# Patient Record
Sex: MALE | Race: WHITE | NOT HISPANIC OR LATINO | URBAN - METROPOLITAN AREA
[De-identification: names, ages, dates, MRNs, and addresses within clinical notes are randomized per-mention and may not be internally consistent; named-entity substitution may affect disease eponyms.]

---

## 2018-08-21 ENCOUNTER — OFFICE (OUTPATIENT)
Dept: URBAN - METROPOLITAN AREA CLINIC 11 | Facility: CLINIC | Age: 53
End: 2018-08-21

## 2018-08-21 VITALS
HEIGHT: 70 IN | WEIGHT: 164 LBS | DIASTOLIC BLOOD PRESSURE: 73 MMHG | SYSTOLIC BLOOD PRESSURE: 129 MMHG | HEART RATE: 57 BPM

## 2018-08-21 DIAGNOSIS — R13.19 OTHER DYSPHAGIA: ICD-10-CM

## 2018-08-21 PROCEDURE — 99203 OFFICE O/P NEW LOW 30 MIN: CPT | Performed by: INTERNAL MEDICINE

## 2018-08-21 RX ORDER — SODIUM SULFATE, POTASSIUM SULFATE, MAGNESIUM SULFATE 17.5; 3.13; 1.6 G/ML; G/ML; G/ML
SOLUTION, CONCENTRATE ORAL
Qty: 1 | Refills: 0 | Status: COMPLETED
Start: 2018-08-21 | End: 2018-12-15

## 2018-08-21 NOTE — SERVICENOTES
We discussed his dysphagia, a dif dx (including rings, strictures, diverticuli, eosinophilic esophagitis), and potentail tx options includign bx and dilation by EGD.  We also discussed his colon cancer screening and evaluation options.  He agreed to the colonsocopy at the time of the EGD.

## 2018-08-21 NOTE — SERVICEHPINOTES
He presetns stating that about once a week "I have trouble swallowing foods".  This has been occuring for quite sometime.  It occurs with meats and breads.  He stated that the foods may stick for 5-10 minutes.  At times drinking a Diet Coke will help it pass. He has had pain with the dysphagia.  He has even tried to jump up and down to get it to pass. He has not had to vomit the food back. He stated that it has been present for years but that he had not sought care for it.  He rarely has issues with heartburn unless he has had a large dinner and wine.  Then he might have reflux at night.    He has not had a colonoscopy as yet.  He has not had a family hx of colon cancer or colon polyps in first degree relatives.  His grandmother had colon cancer in her 70s-80s.  He has not had changes in bowel patterns, bleeding or scuh. .

## 2018-12-15 ENCOUNTER — AMBULATORY SURGICAL CENTER (OUTPATIENT)
Dept: URBAN - METROPOLITAN AREA SURGERY 2 | Facility: SURGERY | Age: 53
End: 2018-12-15
Payer: COMMERCIAL

## 2018-12-15 ENCOUNTER — OFFICE (OUTPATIENT)
Dept: URBAN - METROPOLITAN AREA PATHOLOGY 22 | Facility: PATHOLOGY | Age: 53
End: 2018-12-15
Payer: COMMERCIAL

## 2018-12-15 VITALS
RESPIRATION RATE: 18 BRPM | DIASTOLIC BLOOD PRESSURE: 55 MMHG | OXYGEN SATURATION: 100 % | SYSTOLIC BLOOD PRESSURE: 115 MMHG | HEART RATE: 59 BPM | SYSTOLIC BLOOD PRESSURE: 104 MMHG | OXYGEN SATURATION: 100 % | HEART RATE: 62 BPM | SYSTOLIC BLOOD PRESSURE: 108 MMHG | HEART RATE: 63 BPM | TEMPERATURE: 97.7 F | SYSTOLIC BLOOD PRESSURE: 115 MMHG | HEART RATE: 61 BPM | DIASTOLIC BLOOD PRESSURE: 55 MMHG | SYSTOLIC BLOOD PRESSURE: 106 MMHG | HEIGHT: 70 IN | DIASTOLIC BLOOD PRESSURE: 62 MMHG | RESPIRATION RATE: 18 BRPM | SYSTOLIC BLOOD PRESSURE: 106 MMHG | DIASTOLIC BLOOD PRESSURE: 50 MMHG | SYSTOLIC BLOOD PRESSURE: 104 MMHG | OXYGEN SATURATION: 99 % | HEART RATE: 61 BPM | DIASTOLIC BLOOD PRESSURE: 62 MMHG | HEIGHT: 70 IN | WEIGHT: 165 LBS | HEART RATE: 62 BPM | TEMPERATURE: 97.7 F | OXYGEN SATURATION: 99 % | HEART RATE: 59 BPM | WEIGHT: 165 LBS | DIASTOLIC BLOOD PRESSURE: 50 MMHG | HEART RATE: 63 BPM | SYSTOLIC BLOOD PRESSURE: 108 MMHG

## 2018-12-15 DIAGNOSIS — K29.50 UNSPECIFIED CHRONIC GASTRITIS WITHOUT BLEEDING: ICD-10-CM

## 2018-12-15 DIAGNOSIS — D12.4 BENIGN NEOPLASM OF DESCENDING COLON: ICD-10-CM

## 2018-12-15 DIAGNOSIS — R13.19 OTHER DYSPHAGIA: ICD-10-CM

## 2018-12-15 DIAGNOSIS — Z12.11 ENCOUNTER FOR SCREENING FOR MALIGNANT NEOPLASM OF COLON: ICD-10-CM

## 2018-12-15 DIAGNOSIS — K44.9 DIAPHRAGMATIC HERNIA WITHOUT OBSTRUCTION OR GANGRENE: ICD-10-CM

## 2018-12-15 PROBLEM — K31.89 OTHER DISEASES OF STOMACH AND DUODENUM: Status: ACTIVE | Noted: 2018-12-15

## 2018-12-15 PROCEDURE — 88305 TISSUE EXAM BY PATHOLOGIST: CPT | Performed by: INTERNAL MEDICINE

## 2018-12-15 PROCEDURE — 43239 EGD BIOPSY SINGLE/MULTIPLE: CPT | Mod: 51 | Performed by: INTERNAL MEDICINE

## 2018-12-15 PROCEDURE — 88312 SPECIAL STAINS GROUP 1: CPT | Performed by: INTERNAL MEDICINE

## 2018-12-15 PROCEDURE — 45380 COLONOSCOPY AND BIOPSY: CPT | Mod: 33 | Performed by: INTERNAL MEDICINE

## 2018-12-15 PROCEDURE — 88342 IMHCHEM/IMCYTCHM 1ST ANTB: CPT | Performed by: INTERNAL MEDICINE

## 2018-12-15 PROCEDURE — 88313 SPECIAL STAINS GROUP 2: CPT | Performed by: INTERNAL MEDICINE

## 2018-12-15 RX ORDER — SODIUM SULFATE, POTASSIUM SULFATE, MAGNESIUM SULFATE 17.5; 3.13; 1.6 G/ML; G/ML; G/ML
SOLUTION, CONCENTRATE ORAL
Qty: 1 | Refills: 0 | Status: COMPLETED
Start: 2018-08-21 | End: 2018-12-15

## 2018-12-15 RX ORDER — PANTOPRAZOLE SODIUM 40 MG/1
80 TABLET, DELAYED RELEASE ORAL
Qty: 180 | Refills: 1 | Status: COMPLETED
Start: 2018-12-15 | End: 2019-02-22

## 2019-02-22 ENCOUNTER — OFFICE (OUTPATIENT)
Dept: URBAN - METROPOLITAN AREA CLINIC 11 | Facility: CLINIC | Age: 54
End: 2019-02-22

## 2019-02-22 VITALS
DIASTOLIC BLOOD PRESSURE: 76 MMHG | WEIGHT: 168 LBS | SYSTOLIC BLOOD PRESSURE: 126 MMHG | HEIGHT: 70 IN | HEART RATE: 72 BPM

## 2019-02-22 DIAGNOSIS — R13.19 OTHER DYSPHAGIA: ICD-10-CM

## 2019-02-22 PROCEDURE — 99213 OFFICE O/P EST LOW 20 MIN: CPT | Performed by: INTERNAL MEDICINE

## 2019-02-22 NOTE — SERVICEHPINOTES
He presents for f/u of his GERD and esopahgitis/ulcerations with ring/strictures.  He had been on Protonix but with it was having "huge gas" and even noctural regurgitaiton.  He was changed to Pepcid and his symptoms have resolved.  He has still had some rare issues iwth dysphagai to meats.  He has not had lower chest pain.  He has not ahd reflux/regurgitation on the Pepcid BID.  We reviewed his photos and reviewed his path results.  We discussed his colon polyp, f/u colon in 5 years and family screening recs.

## 2019-02-22 NOTE — SERVICENOTES
We discussed his test results, f/u colon in 5 years, improved GERD, and f/u EGD with dilation of his ring.

## 2019-04-06 ENCOUNTER — AMBULATORY SURGICAL CENTER (OUTPATIENT)
Dept: URBAN - METROPOLITAN AREA SURGERY 2 | Facility: SURGERY | Age: 54
End: 2019-04-06

## 2019-04-06 VITALS
RESPIRATION RATE: 16 BRPM | TEMPERATURE: 97.4 F | RESPIRATION RATE: 20 BRPM | WEIGHT: 160 LBS | HEIGHT: 70 IN | HEART RATE: 56 BPM | OXYGEN SATURATION: 97 % | OXYGEN SATURATION: 100 % | SYSTOLIC BLOOD PRESSURE: 104 MMHG | TEMPERATURE: 97.7 F | DIASTOLIC BLOOD PRESSURE: 58 MMHG | OXYGEN SATURATION: 100 % | WEIGHT: 160 LBS | TEMPERATURE: 97.4 F | DIASTOLIC BLOOD PRESSURE: 56 MMHG | HEART RATE: 56 BPM | OXYGEN SATURATION: 99 % | OXYGEN SATURATION: 99 % | RESPIRATION RATE: 20 BRPM | OXYGEN SATURATION: 98 % | HEIGHT: 70 IN | DIASTOLIC BLOOD PRESSURE: 61 MMHG | RESPIRATION RATE: 19 BRPM | DIASTOLIC BLOOD PRESSURE: 72 MMHG | HEART RATE: 67 BPM | OXYGEN SATURATION: 98 % | RESPIRATION RATE: 16 BRPM | DIASTOLIC BLOOD PRESSURE: 72 MMHG | HEART RATE: 67 BPM | OXYGEN SATURATION: 97 % | DIASTOLIC BLOOD PRESSURE: 61 MMHG | TEMPERATURE: 97.7 F | RESPIRATION RATE: 19 BRPM | SYSTOLIC BLOOD PRESSURE: 104 MMHG | SYSTOLIC BLOOD PRESSURE: 113 MMHG | SYSTOLIC BLOOD PRESSURE: 113 MMHG | HEART RATE: 59 BPM | DIASTOLIC BLOOD PRESSURE: 58 MMHG | HEART RATE: 59 BPM | DIASTOLIC BLOOD PRESSURE: 56 MMHG

## 2019-04-06 DIAGNOSIS — K22.2 ESOPHAGEAL OBSTRUCTION: ICD-10-CM

## 2019-04-06 DIAGNOSIS — R13.19 OTHER DYSPHAGIA: ICD-10-CM

## 2019-04-06 PROCEDURE — 43235 EGD DIAGNOSTIC BRUSH WASH: CPT | Performed by: INTERNAL MEDICINE

## 2019-04-06 PROCEDURE — 43450 DILATE ESOPHAGUS 1/MULT PASS: CPT | Mod: 51 | Performed by: INTERNAL MEDICINE

## 2019-08-06 ENCOUNTER — OFFICE (OUTPATIENT)
Dept: URBAN - METROPOLITAN AREA CLINIC 11 | Facility: CLINIC | Age: 54
End: 2019-08-06

## 2019-08-06 VITALS
SYSTOLIC BLOOD PRESSURE: 132 MMHG | HEART RATE: 65 BPM | WEIGHT: 171 LBS | HEIGHT: 70 IN | DIASTOLIC BLOOD PRESSURE: 78 MMHG

## 2019-08-06 DIAGNOSIS — K22.2 ESOPHAGEAL OBSTRUCTION: ICD-10-CM

## 2019-08-06 PROCEDURE — 99213 OFFICE O/P EST LOW 20 MIN: CPT | Performed by: INTERNAL MEDICINE

## 2019-08-06 NOTE — SERVICENOTES
He has done well off of his meds. We discussed the risks of recurrent issues and need to call if there are difficulties. His next colon is due in 2023.

## 2020-03-05 NOTE — SERVICEHPINOTES
He presents for f/u after his esophageal ring dialtion.  He has tapered his PPI from BID to once daily and now is off of it.  He has not had further issues with heartburn or dysphagia.  He has not had abodminal pain. He has been eating well and without issues.We reviewed his EGDs and bx.  No